# Patient Record
Sex: FEMALE | Race: WHITE | NOT HISPANIC OR LATINO | Employment: PART TIME | URBAN - METROPOLITAN AREA
[De-identification: names, ages, dates, MRNs, and addresses within clinical notes are randomized per-mention and may not be internally consistent; named-entity substitution may affect disease eponyms.]

---

## 2018-07-30 ENCOUNTER — TELEPHONE (OUTPATIENT)
Dept: CARDIOTHORACIC SURGERY | Facility: CLINIC | Age: 68
End: 2018-07-30

## 2018-07-30 NOTE — TELEPHONE ENCOUNTER
Returned pt's call.  Pt stated that she contacted Dr. Holman's office about making an appt to be seen for medication management for her AFib.  I informed her that Dr. Holman is a surgeon, and that she would need to see a provider in the [facility].  I informed her that I would send a message to Dr. Mario De La Rosa office, so that they could contact and schedule her.  Pt verbalized understanding.    ----- Message from Bijal Chaudhary sent at 7/30/2018  4:23 PM CDT -----  Pt called stating she need to speak with nurse to schedule an appt.     She says she has Afib and uncontrolled blood pressure.    Please call [phone] regarding this.    Thanks

## 2019-02-13 ENCOUNTER — OFFICE VISIT (OUTPATIENT)
Dept: CARDIOLOGY | Facility: CLINIC | Age: 69
End: 2019-02-13
Payer: MEDICARE

## 2019-02-13 ENCOUNTER — HOSPITAL ENCOUNTER (OUTPATIENT)
Dept: CARDIOLOGY | Facility: CLINIC | Age: 69
Discharge: HOME | End: 2019-02-13
Payer: MEDICARE

## 2019-02-13 VITALS
SYSTOLIC BLOOD PRESSURE: 160 MMHG | BODY MASS INDEX: 37.24 KG/M2 | HEIGHT: 59 IN | WEIGHT: 184.75 LBS | HEART RATE: 67 BPM | DIASTOLIC BLOOD PRESSURE: 76 MMHG

## 2019-02-13 DIAGNOSIS — I10 HYPERTENSION, UNSPECIFIED TYPE: ICD-10-CM

## 2019-02-13 DIAGNOSIS — E66.9 OBESITY (BMI 35.0-39.9 WITHOUT COMORBIDITY): ICD-10-CM

## 2019-02-13 DIAGNOSIS — I48.0 PAROXYSMAL ATRIAL FIBRILLATION: ICD-10-CM

## 2019-02-13 PROCEDURE — 99999 PR PBB SHADOW E&M-EST. PATIENT-LVL III: CPT | Mod: PBBFAC,,, | Performed by: INTERNAL MEDICINE

## 2019-02-13 PROCEDURE — 99204 OFFICE O/P NEW MOD 45 MIN: CPT | Mod: S$PBB,,, | Performed by: INTERNAL MEDICINE

## 2019-02-13 PROCEDURE — 99204 PR OFFICE/OUTPT VISIT, NEW, LEVL IV, 45-59 MIN: ICD-10-PCS | Mod: S$PBB,,, | Performed by: INTERNAL MEDICINE

## 2019-02-13 PROCEDURE — 99213 OFFICE O/P EST LOW 20 MIN: CPT | Mod: PBBFAC | Performed by: INTERNAL MEDICINE

## 2019-02-13 PROCEDURE — 93010 ELECTROCARDIOGRAM REPORT: CPT | Mod: S$PBB,,, | Performed by: INTERNAL MEDICINE

## 2019-02-13 PROCEDURE — 99999 PR PBB SHADOW E&M-EST. PATIENT-LVL III: ICD-10-PCS | Mod: PBBFAC,,, | Performed by: INTERNAL MEDICINE

## 2019-02-13 PROCEDURE — 93005 ELECTROCARDIOGRAM TRACING: CPT | Mod: PBBFAC | Performed by: INTERNAL MEDICINE

## 2019-02-13 PROCEDURE — 93010 EKG 12-LEAD: ICD-10-PCS | Mod: S$PBB,,, | Performed by: INTERNAL MEDICINE

## 2019-02-13 RX ORDER — LOSARTAN POTASSIUM 50 MG/1
TABLET ORAL
Refills: 11 | COMMUNITY
Start: 2019-02-07 | End: 2019-04-05

## 2019-02-13 RX ORDER — BISOPROLOL FUMARATE 5 MG/1
5 TABLET, FILM COATED ORAL DAILY
Refills: 11 | COMMUNITY
Start: 2018-12-01 | End: 2019-04-05

## 2019-02-13 RX ORDER — ALENDRONATE SODIUM 70 MG/1
TABLET ORAL
COMMUNITY
End: 2019-04-05

## 2019-02-13 RX ORDER — CYANOCOBALAMIN/FOLIC AC/VIT B6 1-2.5-25MG
1 TABLET ORAL DAILY
Refills: 3 | COMMUNITY
Start: 2019-02-07

## 2019-02-13 RX ORDER — HYDROCHLOROTHIAZIDE 25 MG/1
25 TABLET ORAL DAILY
Refills: 11 | COMMUNITY
Start: 2018-12-03 | End: 2019-04-05

## 2019-02-13 RX ORDER — ERGOCALCIFEROL 1.25 MG/1
50000 CAPSULE ORAL
Refills: 3 | COMMUNITY
Start: 2018-12-19 | End: 2019-04-05

## 2019-02-13 NOTE — PROGRESS NOTES
Chart has been dictated using voice recognition software.  It is not been reviewed carefully for any transcriptional errors due to this technology.   Subjective:   Patient ID:  Diamante Overton is a 68 y.o. female who presents for evaluation of Palpitations (x 12 months); Hypertension; and Establish Care      HPI:  If patient with history of atrial fibrillation, hyperlipidemia, and hypertension previously followed by a cardiologist in [place] with [place].  First diagnosed with atrial fibrillation in 2007. Treated with meds to convert into sinus - had 2 other episodes.     Currently, has labile BP.  When up, will feel lightheaded and dizzy with facial flushing and headache..  Checks BP regularly, notices that salt will increase BP.  Also, has a lot of anxiety which drives up BP. Has palpitations (heart jumping in chest) several times a day.  With atrial fibrillation, feels as if heart going to jump out of chest and will be short of breath and dizzy.  Has had vasovagal syncope, last time 3 years ago.     Patient denies any chest discomfort on exertion or at rest. Will occasionally get dyspnea at rest but not with exertion.      Past Medical History:   Diagnosis Date    Hypertension        No past surgical history on file.    Social History     Tobacco Use    Smoking status: Never Smoker    Smokeless tobacco: Never Used   Substance Use Topics    Alcohol use: No     Frequency: Never    Drug use: Not on file       Outpatient Medications Prior to Visit   Medication Sig Dispense Refill    alendronate (FOSAMAX) 70 MG tablet alendronate 70 mg tablet weekly      bisoprolol (ZEBETA) 5 MG tablet Take 5 mg by mouth once daily.  11    FOLBEE 2.5-25-1 mg Tab Take 1 tablet by mouth once daily.  3    hydroCHLOROthiazide (HYDRODIURIL) 25 MG tablet Take 25 mg by mouth once daily.  11    losartan (COZAAR) 50 MG tablet TAKE 1 TABLET BY ORAL ROUTE EVERY DAY  11    VITAMIN D2 50,000 unit capsule Take 50,000 Units by mouth every  "7 days.  3     No facility-administered medications prior to visit.        Review of patient's allergies indicates:   Allergen Reactions    Calcium channel blocking agent diltiazem analogues      Other reaction(s): Headache       Review of Systems   Constitution: Positive for weight gain. Negative for weight loss.   HENT: Positive for nosebleeds (blood when blows nose).    Eyes: Negative for vision loss in left eye and vision loss in right eye.   Cardiovascular: Negative for claudication.        As above   Respiratory: Positive for snoring. Negative for hemoptysis, shortness of breath, sputum production and wheezing.    Endocrine: Negative for polydipsia and polyuria.   Hematologic/Lymphatic: Does not bruise/bleed easily.   Musculoskeletal: Positive for back pain. Negative for myalgias.   Gastrointestinal: Negative for change in bowel habit, hematemesis, hematochezia, melena, nausea and vomiting.   Genitourinary: Negative for hematuria.   Neurological: Positive for numbness (fingertips in right hand). Negative for focal weakness.     Objective:   Physical Exam   Constitutional: She is oriented to person, place, and time. She appears well-developed and well-nourished.   BP (!) 160/76 (BP Location: Left arm, Patient Position: Sitting, BP Method: X-Large (Automatic))   Pulse 67   Ht 4' 11" (1.499 m)   Wt 83.8 kg (184 lb 11.9 oz)   BMI 37.31 kg/m²   Obese   Neck: Neck supple. No JVD present. Carotid bruit is not present. No thyromegaly present.   Cardiovascular: Normal rate, regular rhythm, S1 normal, S2 normal and intact distal pulses.  Occasional extrasystoles are present. Exam reveals S4. Exam reveals no friction rub.   No murmur heard.  Pulmonary/Chest: Breath sounds normal. She has no wheezes. She has no rales.   Abdominal: Soft. Bowel sounds are normal. There is no hepatosplenomegaly. There is no tenderness.   Musculoskeletal: She exhibits no edema.   Neurological: She is alert and oriented to person, place, " and time. She has normal strength.   Skin: No cyanosis. Nails show no clubbing.       No results found for: WBC, HGB, HCT, MCV, PLT    No results found for: NA, K, BUN, CREATININE, GLU, HGBA1C, CHOL, HDL, LDLCALC, TRIG, CHOLHDL, HGB, HCT, PLT, INR     ECG showed sinus rhythm and was a normal ECG.   Assessment:     1. Paroxysmal atrial fibrillation    2. Hypertension, unspecified type    3. Obesity (BMI 35.0-39.9 without comorbidity)      Patient currently remains in sinus rhythm with occasional ectopy.  She remains anticoagulated.  Her blood pressure today is reasonable.  The patient probably could had used some tweaking of her medications.  In order to treat her would need to know what her home blood pressure does at least twice a day.  Additionally, she should be evaluated for sleep apnea should probably use positive air pressure therapy.  Obviously, the patient will need to lose weight and exercise more.  The strength is her son is encouraging her to exercise more.  Additionally, current cardiac status should be re-evaluated with probable Holter monitor and echocardiogram.  However, the would not proceed with these diagnostic tests unless the patient is going to be followed here.  Finally, I cannot find evidence of lipid profile in her Care everywhere charts.  The patient clearly needs to have her lipid profile evaluated and treated as needed.  Patient will contact me to let me know if she wants to continue coming here to see me at which time further decisions and test will be ordered.  Plan:     Diamante was seen today for palpitations, hypertension and establish care.    Diagnoses and all orders for this visit:    Paroxysmal atrial fibrillation    Hypertension, unspecified type    Obesity (BMI 35.0-39.9 without comorbidity)    Other orders  -     alendronate (FOSAMAX) 70 MG tablet; alendronate 70 mg tablet weekly  -     bisoprolol (ZEBETA) 5 MG tablet; Take 5 mg by mouth once daily.  -     FOLBEE 2.5-25-1 mg Tab;  Take 1 tablet by mouth once daily.  -     hydroCHLOROthiazide (HYDRODIURIL) 25 MG tablet; Take 25 mg by mouth once daily.  -     losartan (COZAAR) 50 MG tablet; TAKE 1 TABLET BY ORAL ROUTE EVERY DAY  -     VITAMIN D2 50,000 unit capsule; Take 50,000 Units by mouth every 7 days.          Kin Denton MD  Consultative Cardiology

## 2019-02-13 NOTE — LETTER
February 13, 2019        Farnaz Cain MD  [address] [facility]  [place] [address]             [facility] Cardiology  [address]  [place] [address]  Phone: [phone]   Patient: Diamante Overton   MR Number: [id number]   YOB: 1950   Date of Visit: 2/13/2019       Dear Dr. Cain:    Thank you for referring Diamante Overton to me for evaluation. Attached you will find relevant portions of my assessment and plan of care.    If you have questions, please do not hesitate to call me. I look forward to following Diamante Overton along with you.    Sincerely,      Kin Denton MD            CC  No Recipients    Enclosure

## 2019-02-14 ENCOUNTER — TELEPHONE (OUTPATIENT)
Dept: CARDIOLOGY | Facility: CLINIC | Age: 69
End: 2019-02-14

## 2019-02-14 DIAGNOSIS — I10 HYPERTENSION, UNSPECIFIED TYPE: Primary | ICD-10-CM

## 2019-04-05 ENCOUNTER — OFFICE VISIT (OUTPATIENT)
Dept: OBSTETRICS AND GYNECOLOGY | Facility: CLINIC | Age: 69
End: 2019-04-05
Payer: MEDICARE

## 2019-04-05 VITALS
HEIGHT: 59 IN | DIASTOLIC BLOOD PRESSURE: 80 MMHG | BODY MASS INDEX: 38.23 KG/M2 | SYSTOLIC BLOOD PRESSURE: 132 MMHG | WEIGHT: 189.63 LBS

## 2019-04-05 DIAGNOSIS — R10.2 PELVIC PAIN: ICD-10-CM

## 2019-04-05 DIAGNOSIS — Z12.31 ENCOUNTER FOR SCREENING MAMMOGRAM FOR BREAST CANCER: ICD-10-CM

## 2019-04-05 DIAGNOSIS — N95.9 MENOPAUSAL AND POSTMENOPAUSAL DISORDER: ICD-10-CM

## 2019-04-05 DIAGNOSIS — Z01.419 WELL WOMAN EXAM WITH ROUTINE GYNECOLOGICAL EXAM: Primary | ICD-10-CM

## 2019-04-05 DIAGNOSIS — R10.31 ABDOMINAL PAIN, RLQ (RIGHT LOWER QUADRANT): ICD-10-CM

## 2019-04-05 LAB
BILIRUB UR QL STRIP: NEGATIVE
CLARITY UR REFRACT.AUTO: ABNORMAL
COLOR UR AUTO: YELLOW
GLUCOSE UR QL STRIP: NEGATIVE
HGB UR QL STRIP: NEGATIVE
KETONES UR QL STRIP: NEGATIVE
LEUKOCYTE ESTERASE UR QL STRIP: ABNORMAL
MICROSCOPIC COMMENT: NORMAL
NITRITE UR QL STRIP: NEGATIVE
NON-SQ EPI CELLS #/AREA URNS AUTO: <1 /HPF
PH UR STRIP: 7 [PH] (ref 5–8)
PROT UR QL STRIP: NEGATIVE
RBC #/AREA URNS AUTO: 1 /HPF (ref 0–4)
SP GR UR STRIP: 1.01 (ref 1–1.03)
SQUAMOUS #/AREA URNS AUTO: 13 /HPF
URN SPEC COLLECT METH UR: ABNORMAL
WBC #/AREA URNS AUTO: 3 /HPF (ref 0–5)

## 2019-04-05 PROCEDURE — 87086 URINE CULTURE/COLONY COUNT: CPT

## 2019-04-05 PROCEDURE — 88175 CYTOPATH C/V AUTO FLUID REDO: CPT

## 2019-04-05 PROCEDURE — 99999 PR PBB SHADOW E&M-EST. PATIENT-LVL III: ICD-10-PCS | Mod: PBBFAC,,, | Performed by: OBSTETRICS & GYNECOLOGY

## 2019-04-05 PROCEDURE — 99213 OFFICE O/P EST LOW 20 MIN: CPT | Mod: PBBFAC,25 | Performed by: OBSTETRICS & GYNECOLOGY

## 2019-04-05 PROCEDURE — 81001 URINALYSIS AUTO W/SCOPE: CPT

## 2019-04-05 PROCEDURE — G0101 CA SCREEN;PELVIC/BREAST EXAM: HCPCS | Mod: PBBFAC

## 2019-04-05 PROCEDURE — 99999 PR PBB SHADOW E&M-EST. PATIENT-LVL III: CPT | Mod: PBBFAC,,, | Performed by: OBSTETRICS & GYNECOLOGY

## 2019-04-05 PROCEDURE — G0101 PR CA SCREEN;PELVIC/BREAST EXAM: ICD-10-PCS | Mod: S$PBB,,, | Performed by: OBSTETRICS & GYNECOLOGY

## 2019-04-05 PROCEDURE — G0101 CA SCREEN;PELVIC/BREAST EXAM: HCPCS | Mod: S$PBB,,, | Performed by: OBSTETRICS & GYNECOLOGY

## 2019-04-05 RX ORDER — LOSARTAN POTASSIUM 50 MG/1
50 TABLET ORAL
COMMUNITY

## 2019-04-05 RX ORDER — BISOPROLOL FUMARATE 5 MG/1
TABLET, FILM COATED ORAL
COMMUNITY

## 2019-04-05 RX ORDER — HYDROCHLOROTHIAZIDE 25 MG/1
TABLET ORAL
COMMUNITY

## 2019-04-05 RX ORDER — ERGOCALCIFEROL 1.25 MG/1
CAPSULE ORAL
COMMUNITY

## 2019-04-06 LAB
BACTERIA UR CULT: NORMAL
BACTERIA UR CULT: NORMAL

## 2019-04-07 NOTE — PROGRESS NOTES
HPI:  68 y.o. female patient  presents today for annual exam.  She is c/o longstanding RLQ abdominal pain for past 2 years.  Pain is intermittent, but severe at times.   since age 57.  Not on HRT.  History of endometriosis in past, diagnosed with laparotomy in her 20's.  One ovary removed at time of that surgery.  Subsequently had 2 c-sections with her children.  History of spinal stenosis.      Past Medical History:   Diagnosis Date    Hypertension        History reviewed. No pertinent surgical history.    REVIEW OF SYSTEMS:  GENERAL:  No fever, chills, fatigue, or weight loss  ABDOMEN:  Normal appetite, no weight loss or abdominal pain  URINARY:  No flank pain, dysuria, or hematuria  REPRODUCTIVE:  No abnormal vaginal bleeding  BREASTS:  No tenderness, masses, or nipple discharge noted of breasts    PHYSICAL EXAM:    APPEARANCE:  Well nourished, well developed, in no acute distress  NECK:  Symmetric without masses or thyromegaly  BREASTS:  Symmetrical, no skin changes or visible lesions.  No palpable masses, nipple discharge, or adenopathy bilaterally.  ABDOMEN:  Soft, no tenderness or masses, no distension noted  PELVIC:  VULVA:  No lesions.  Normal female genitalia  URETHRAL MEATUS:  Normal size and location.  No lesions.  No prolapse  URETHRA:  No masses, tenderness, prolapse, or scarring  VAGINA:  No lesions or discharge.  No significant cystocele or rectocele  CERVIX:  No lesions.  Normal diameter, no cervical motion tenderness.  UTERUS:  4-6 week size, regular shape, mobile, non-tender, normal position, good support  ADNEXA:  No masses or tenderness  ANUS AND PERINEUM:  No lesions.  No external hemorrhoids    1. Well woman exam with routine gynecological exam  Liquid-based pap smear, screening   2. Abdominal pain, RLQ (right lower quadrant)  US Pelvis Comp with Transvag NON-OB (xpd   3. Encounter for screening mammogram for breast cancer  Mammo Digital Screening Bilat   4. Menopausal and  postmenopausal disorder  DXA Bone Density Spine And Hip   5. Pelvic pain  Urinalysis    Urine culture         PLAN:  Pelvic u/s ordered.  MMG and dexa scan ordered.  Will notif patient of test results.  Patient counseled regarding recommended routine health maintenance for her age.

## 2019-04-24 ENCOUNTER — TELEPHONE (OUTPATIENT)
Dept: RADIOLOGY | Facility: HOSPITAL | Age: 69
End: 2019-04-24

## 2019-05-08 ENCOUNTER — HOSPITAL ENCOUNTER (OUTPATIENT)
Dept: RADIOLOGY | Facility: HOSPITAL | Age: 69
Discharge: HOME | End: 2019-05-08
Attending: OBSTETRICS & GYNECOLOGY
Payer: MEDICARE

## 2019-05-08 VITALS — WEIGHT: 189 LBS | BODY MASS INDEX: 38.1 KG/M2 | HEIGHT: 59 IN

## 2019-05-08 DIAGNOSIS — Z12.31 ENCOUNTER FOR SCREENING MAMMOGRAM FOR BREAST CANCER: ICD-10-CM

## 2019-05-08 DIAGNOSIS — R10.31 ABDOMINAL PAIN, RLQ (RIGHT LOWER QUADRANT): ICD-10-CM

## 2019-05-08 PROCEDURE — 77063 BREAST TOMOSYNTHESIS BI: CPT | Mod: 26,,, | Performed by: RADIOLOGY

## 2019-05-08 PROCEDURE — 77067 SCR MAMMO BI INCL CAD: CPT | Mod: TC

## 2019-05-08 PROCEDURE — 76856 US EXAM PELVIC COMPLETE: CPT | Mod: 26,,, | Performed by: RADIOLOGY

## 2019-05-08 PROCEDURE — 76830 US PELVIS COMP WITH TRANSVAG NON-OB (XPD): ICD-10-PCS | Mod: 26,,, | Performed by: RADIOLOGY

## 2019-05-08 PROCEDURE — 76830 TRANSVAGINAL US NON-OB: CPT | Mod: TC

## 2019-05-08 PROCEDURE — 76856 US PELVIS COMP WITH TRANSVAG NON-OB (XPD): ICD-10-PCS | Mod: 26,,, | Performed by: RADIOLOGY

## 2019-05-08 PROCEDURE — 77067 SCR MAMMO BI INCL CAD: CPT | Mod: 26,,, | Performed by: RADIOLOGY

## 2019-05-08 PROCEDURE — 77067 MAMMO DIGITAL SCREENING BILAT WITH TOMOSYNTHESIS_CAD: ICD-10-PCS | Mod: 26,,, | Performed by: RADIOLOGY

## 2019-05-08 PROCEDURE — 77063 MAMMO DIGITAL SCREENING BILAT WITH TOMOSYNTHESIS_CAD: ICD-10-PCS | Mod: 26,,, | Performed by: RADIOLOGY

## 2019-05-08 PROCEDURE — 76830 TRANSVAGINAL US NON-OB: CPT | Mod: 26,,, | Performed by: RADIOLOGY

## 2019-05-10 ENCOUNTER — TELEPHONE (OUTPATIENT)
Dept: OBSTETRICS AND GYNECOLOGY | Facility: CLINIC | Age: 69
End: 2019-05-10

## 2019-05-10 NOTE — TELEPHONE ENCOUNTER
----- Message from Yvrose Hebert MD sent at 5/8/2019  4:58 PM CDT -----  Please notify patient that her pelvic ultrasound is within normal limits.  There were 2 very small fibroids (less than 1 cm) noted, which are not a cause for concern.  No ovarian cysts or masses noted.  If her pain worsens, I would recommend that she consider laparoscopy.

## 2020-04-21 ENCOUNTER — TELEPHONE (OUTPATIENT)
Dept: OBSTETRICS AND GYNECOLOGY | Facility: CLINIC | Age: 70
End: 2020-04-21

## 2020-04-22 ENCOUNTER — TELEPHONE (OUTPATIENT)
Dept: OBSTETRICS AND GYNECOLOGY | Facility: CLINIC | Age: 70
End: 2020-04-22

## 2020-04-22 NOTE — TELEPHONE ENCOUNTER
----- Message from Ellie Wayne sent at 4/22/2020  9:02 AM CDT -----  Contact: pt   Pt called about the fax she requested about her Mammo please reached out to pt to discuss this matter at [phone]

## 2020-04-22 NOTE — TELEPHONE ENCOUNTER
Spoke to patient. Patient stated that she is needing to get the images from the last mmg. Informed patient to call radiology image request line and advised of phone number. Patient verbalized understanding.

## 2021-11-26 ENCOUNTER — TELEPHONE (OUTPATIENT)
Dept: OBSTETRICS AND GYNECOLOGY | Facility: CLINIC | Age: 71
End: 2021-11-26
Payer: COMMERCIAL

## 2021-12-06 ENCOUNTER — PATIENT MESSAGE (OUTPATIENT)
Dept: RESEARCH | Facility: HOSPITAL | Age: 71
End: 2021-12-06
Payer: COMMERCIAL
